# Patient Record
Sex: MALE | Race: WHITE | NOT HISPANIC OR LATINO | Employment: UNEMPLOYED | URBAN - NONMETROPOLITAN AREA
[De-identification: names, ages, dates, MRNs, and addresses within clinical notes are randomized per-mention and may not be internally consistent; named-entity substitution may affect disease eponyms.]

---

## 2022-09-21 ENCOUNTER — OFFICE VISIT (OUTPATIENT)
Dept: SURGERY | Facility: CLINIC | Age: 52
End: 2022-09-21

## 2022-09-21 VITALS
SYSTOLIC BLOOD PRESSURE: 156 MMHG | WEIGHT: 210 LBS | DIASTOLIC BLOOD PRESSURE: 102 MMHG | RESPIRATION RATE: 16 BRPM | HEIGHT: 67 IN | BODY MASS INDEX: 32.96 KG/M2 | HEART RATE: 80 BPM

## 2022-09-21 DIAGNOSIS — L98.8 DYSPLASTIC SKIN LESION: ICD-10-CM

## 2022-09-21 DIAGNOSIS — R07.9 CHEST PAIN, UNSPECIFIED TYPE: Primary | ICD-10-CM

## 2022-09-21 PROCEDURE — 99203 OFFICE O/P NEW LOW 30 MIN: CPT | Performed by: SURGERY

## 2022-09-21 RX ORDER — ASPIRIN 325 MG
325 TABLET ORAL DAILY
COMMUNITY

## 2022-09-22 ENCOUNTER — TELEPHONE (OUTPATIENT)
Dept: SURGERY | Facility: CLINIC | Age: 52
End: 2022-09-22

## 2022-09-22 DIAGNOSIS — R07.9 CHEST PAIN, UNSPECIFIED TYPE: Primary | ICD-10-CM

## 2023-03-21 NOTE — PROGRESS NOTES
ASSESSMENT/PLAN:  1.  Well Adult Visit:  Anticipatory guidance provided (SEE After visit summary and note).  Immunizations: Deferred  Pap Smear: not indicated  Colonoscopy: Not indicated at this time.  Mammogram: Not indicated.  Labs: See Orders  Follow-Up: Annually or sooner as needed for illness/concerns.  Body mass index is 25.94 kg/m².: Patient BMI is within normal range.    SUBJECTIVE:  Chief Complaint   Patient presents with   • Physical       Diet: Could be better  Calcium: Supplement  Exercise: Not enough  Tobacco Use: None  Alcohol:  Social  Relationships: Good  Immunizations: UTD  Hearing:  Normal  Vision: Normal  Smoke Detectors: Yes  Carbon Monoxide Detectors: Yes  Seatbelt: 100%  Dentist: Needs to see.    Other Concerns:  None    I have reviewed the patient's medications and allergies, past medical, surgical, social and family history, updating these as appropriate.  See Histories section of the Electronic Medical Record for a display of this information.    OBJECTIVE:  Blood pressure 119/77, pulse 84, resp. rate 18, height 5' (1.524 m), weight 60.2 kg (132 lb 12.8 oz), SpO2 100 %.Body mass index is 25.94 kg/m².  Vital signs: Reviewed as documented in the nursing notes.  General: The patient is alert, cooperative and conversive.   Skin:  Warm, dry and intact without evidence of rash.    Head:  Normocephalic-atraumatic.   Neck:  The trachea is midline. No evidence of jugular venous distention.  Normal symmetrical thyroid without mass or tenderness.  Eye:  Pupils are equal, round and reactive to light.  Bilateral extraocular movements are intact.  Normal conjunctivae and sclerae bilaterally.     EENT:  Tympanic membranes and external auditory canals are normal bilaterally.  No pharyngeal erythema or exudate.  No facial tenderness.  Normal nasal mucosa.   Chest wall:  No deformity or tenderness to palpation.  Cardiovascular:  Regular rate and rhythm without murmur.  Normal perfusion with symmetrical  Kenji Dawson 51 y.o. male presents @ the req of Dr. Jarquin for eval of c-scope and multiple skin lesions.  Pt also wanted to report that he started an intense workout program approx 6-8 weeks ago and experienced angina.  Pt since that time placed himself on Aspirin 325 mg 2-3 times a day.  Pt does not have a cardiologist.  Pt is aware he has HTN but does not take any meds.  + H/O basal cell CA on face and is willing to see derm but states his ins req the ef to com from PCP.   Chief Complaint   Patient presents with   • Colonoscopy   • Skin Lesion             HPI   Above-noted agree.  Kenji is a very pleasant 50-year-old male.  He had a basal cell carcinoma removed from his nose.  He has a dysplastic appearing skin lesion on his right arm and a small nodule on his back that he would like removed.  Unfortunately, he has been having issues with chest pain on exertion and has started to take high-dose aspirin.  He has never seen a cardiologist before.  He is very anxious.  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  He does have hypertension.  He does not want to take any type of medication for it.  He does not smoke or use tobacco products.  He has never had a colonoscopy for.  He says he has had issues with his GI track.  He says he was diagnosed with diverticulitis in the past.  He is willing to get a colonoscopy but at this time he wants to hold off on that.      Review of Systems   All other systems reviewed and are negative.            Current Outpatient Medications:   •  aspirin 325 MG tablet, Take 325 mg by mouth Daily., Disp: , Rfl:         No Known Allergies        Past Medical History:   Diagnosis Date   • HTN (hypertension)    • Polycystic kidney disease            Past Surgical History:   Procedure Laterality Date   • APPENDECTOMY             Social History     Tobacco Use   • Smoking status: Never Smoker   • Smokeless tobacco: Never Used   Substance Use  "Topics   • Alcohol use: Yes             There is no immunization history on file for this patient.        Physical Exam  Vitals and nursing note reviewed.   Constitutional:       Appearance: Normal appearance.   HENT:      Head: Normocephalic and atraumatic.   Musculoskeletal:         General: No swelling or tenderness.   Skin:     Comments: Less than 1 cm sized raised skin lesion on the back.  On the right forearm is a brown irregularly shaped less than 1 cm skin lesion.   Neurological:      General: No focal deficit present.      Mental Status: He is alert and oriented to person, place, and time.   Psychiatric:         Mood and Affect: Mood normal.         Behavior: Behavior normal.         Debilities/Disabilities Identified: None    Emotional Behavior: Appropriate      BP (!) 156/102   Pulse 80   Resp 16   Ht 170.2 cm (67\")   Wt 95.3 kg (210 lb)   BMI 32.89 kg/m²         Diagnoses and all orders for this visit:    1. Chest pain, unspecified type (Primary)    2. Dysplastic skin lesion    Prior to doing any type of surgical procedure on Kenji, we are going to refer him to cardiology.  I had a long discussion with him about the importance of taking care of himself.  We discussed if he did not take care of himself he would be unable to take care of any elderly parent.  We will put in the referral to see Dr. JOE and follow-up with him after that.    Thank you for allowing me to participate in the care of this interesting patient.      " pulses.   Respiratory:  Clear to auscultation with symmetrical  breath sounds.  Gastrointestinal:  Non-distended, soft and nontender.  Normal bowel sounds.  No hepatomegaly or splenomegaly.  Back:  No costovertebral angle tenderness or midline bony tenderness.  Normal alignment.      Musculoskeletal:  No deformity, edema or joint effusion.  No tenderness to palpation.     Neurological:  Alert and oriented times 4.  Cranial Nerves 2-12 intact.  No focal neurological deficits.  No lateralizing signs.  Normal tandem gait without ataxia.  Psychiatric:  Cooperative.  Appropriate mood and affect.

## 2024-08-01 ENCOUNTER — PREP FOR SURGERY (OUTPATIENT)
Dept: OTHER | Facility: HOSPITAL | Age: 54
End: 2024-08-01
Payer: MEDICAID

## 2024-08-01 ENCOUNTER — HOSPITAL ENCOUNTER (OUTPATIENT)
Facility: HOSPITAL | Age: 54
Setting detail: OBSERVATION
LOS: 1 days | Discharge: HOME | End: 2024-08-03
Attending: SURGERY | Admitting: SURGERY
Payer: MEDICAID

## 2024-08-01 DIAGNOSIS — K80.20 GALLSTONES: ICD-10-CM

## 2024-08-01 DIAGNOSIS — K80.00 ACUTE CHOLECYSTITIS DUE TO BILIARY CALCULUS: Primary | ICD-10-CM

## 2024-08-01 DIAGNOSIS — K80.20 GALLSTONES: Primary | ICD-10-CM

## 2024-08-01 PROCEDURE — 94761 N-INVAS EAR/PLS OXIMETRY MLT: CPT

## 2024-08-01 PROCEDURE — G0379 DIRECT REFER HOSPITAL OBSERV: HCPCS

## 2024-08-01 PROCEDURE — 96375 TX/PRO/DX INJ NEW DRUG ADDON: CPT

## 2024-08-01 PROCEDURE — 25010000002 ONDANSETRON PER 1 MG: Performed by: SURGERY

## 2024-08-01 PROCEDURE — 96374 THER/PROPH/DIAG INJ IV PUSH: CPT

## 2024-08-01 PROCEDURE — 96361 HYDRATE IV INFUSION ADD-ON: CPT

## 2024-08-01 PROCEDURE — 25010000002 HYDROMORPHONE PER 4 MG: Performed by: SURGERY

## 2024-08-01 PROCEDURE — 25810000003 LACTATED RINGERS PER 1000 ML: Performed by: SURGERY

## 2024-08-01 PROCEDURE — G0378 HOSPITAL OBSERVATION PER HR: HCPCS

## 2024-08-01 RX ORDER — SODIUM CHLORIDE, SODIUM LACTATE, POTASSIUM CHLORIDE, CALCIUM CHLORIDE 600; 310; 30; 20 MG/100ML; MG/100ML; MG/100ML; MG/100ML
100 INJECTION, SOLUTION INTRAVENOUS CONTINUOUS
Status: DISCONTINUED | OUTPATIENT
Start: 2024-08-01 | End: 2024-08-02

## 2024-08-01 RX ORDER — HYDROCODONE BITARTRATE AND ACETAMINOPHEN 5; 325 MG/1; MG/1
1 TABLET ORAL EVERY 6 HOURS PRN
Status: DISCONTINUED | OUTPATIENT
Start: 2024-08-01 | End: 2024-08-02

## 2024-08-01 RX ORDER — SODIUM CHLORIDE 9 MG/ML
40 INJECTION, SOLUTION INTRAVENOUS AS NEEDED
Status: DISCONTINUED | OUTPATIENT
Start: 2024-08-01 | End: 2024-08-02

## 2024-08-01 RX ORDER — HYDROMORPHONE HYDROCHLORIDE 1 MG/ML
0.5 INJECTION, SOLUTION INTRAMUSCULAR; INTRAVENOUS; SUBCUTANEOUS
Status: DISCONTINUED | OUTPATIENT
Start: 2024-08-01 | End: 2024-08-02

## 2024-08-01 RX ORDER — SODIUM CHLORIDE 0.9 % (FLUSH) 0.9 %
10 SYRINGE (ML) INJECTION EVERY 12 HOURS SCHEDULED
Status: DISCONTINUED | OUTPATIENT
Start: 2024-08-01 | End: 2024-08-02

## 2024-08-01 RX ORDER — ONDANSETRON 2 MG/ML
4 INJECTION INTRAMUSCULAR; INTRAVENOUS EVERY 6 HOURS PRN
Status: DISCONTINUED | OUTPATIENT
Start: 2024-08-01 | End: 2024-08-02

## 2024-08-01 RX ORDER — SODIUM CHLORIDE 0.9 % (FLUSH) 0.9 %
10 SYRINGE (ML) INJECTION AS NEEDED
Status: DISCONTINUED | OUTPATIENT
Start: 2024-08-01 | End: 2024-08-02

## 2024-08-01 RX ADMIN — HYDROMORPHONE HYDROCHLORIDE 0.5 MG: 1 INJECTION, SOLUTION INTRAMUSCULAR; INTRAVENOUS; SUBCUTANEOUS at 18:17

## 2024-08-01 RX ADMIN — ONDANSETRON 4 MG: 2 INJECTION INTRAMUSCULAR; INTRAVENOUS at 18:35

## 2024-08-01 RX ADMIN — SODIUM CHLORIDE, POTASSIUM CHLORIDE, SODIUM LACTATE AND CALCIUM CHLORIDE 100 ML/HR: 600; 310; 30; 20 INJECTION, SOLUTION INTRAVENOUS at 18:35

## 2024-08-01 RX ADMIN — HYDROCODONE BITARTRATE AND ACETAMINOPHEN 1 TABLET: 5; 325 TABLET ORAL at 19:18

## 2024-08-01 NOTE — PLAN OF CARE
Goal Outcome Evaluation:  Plan of Care Reviewed With: patient        Progress: no change  Outcome Evaluation: New admit, pt requesting to speak to CM due to being uninsured and needing to know approxiomater cost for surgery and stay. He is also wanting to assure we use lab and US results he had done at [facility] this am and not repeat tests.    Test results obtained from [facility] were taken to registration and scanned into registration docs. Copies placed in paper chart for MD to review.

## 2024-08-01 NOTE — H&P
General Surgery      Patient Care Team:  Provider, No Known as PCP - General    CHIEF COMPLAINT: Acute cholecystitis with cholelithiasis    HISTORY OF PRESENT ILLNESS:    This very pleasant 53-year-old male is actually known to my service.  I saw him several years ago where we discussed having colonoscopy and some basal cell lesions excised.  He presented to [facility]'s emergency department today with right upper quadrant pain rating around to his back and nausea vomiting.  He had elevated white count of 11 and a ultrasound that showed gallstones with gallbladder wall thickening.  They did not have a surgeon available so I excepted him here at [place] in transfer.  This is a third attack in about the last 6 months.  Each attack has gotten more severe.      Past Medical History:   Diagnosis Date    HTN (hypertension)     Polycystic kidney disease      Past Surgical History:   Procedure Laterality Date    APPENDECTOMY       Family History   Problem Relation Age of Onset    Heart disease Mother     Heart disease Father      Social History     Tobacco Use    Smoking status: Never    Smokeless tobacco: Never   Vaping Use    Vaping status: Never Used   Substance Use Topics    Alcohol use: Yes     Alcohol/week: 10.0 standard drinks of alcohol     Types: 10 Cans of beer per week    Drug use: Never     Medications Prior to Admission   Medication Sig Dispense Refill Last Dose    aspirin 325 MG tablet Take 1 tablet by mouth Daily.        Allergies:  Patient has no known allergies.    REVIEW OF SYSTEMS:  Please see the above history of present illness for pertinent positives and negatives.  The remainder of the patient's systems have been reviewed and are negative.     Vital Signs  Temp:  [97.7 °F (36.5 °C)] 97.7 °F (36.5 °C)  Heart Rate:  [89] 89  Resp:  [20] 20  BP: (196)/(102) 196/102         Physical Exam:  Physical Exam   Constitutional: Patient appears well-developed and well-nourished and in no acute distress    HEENT:   Head: Normocephalic and atraumatic.   Cardiovascular: Regular rate, regular rhythm.  Pulmonary/Chest: Lungs are clear to auscultation bilaterally.  Abdominal: Soft, good bowel sounds  Musculoskeletal: Normal posture.  Extremities: No edema.   Neurological: Patient is alert and oriented.  Psychological:   Mood and behavior appropriate.  Skin: Skin is warm and dry.    Debilities/Disabilities Identified: None    Emotional Behavior: Appropriate           Results Review:    I reviewed the patient's new clinical results.  Lab Results (most recent)       None            Imaging Results (Most Recent)       None              ECG/EMG Results (most recent)       None              Assessment & Plan     Acute cholecystitis with cholelithiasis  I discussed with Kenji the benefits and risks and rationale for a laparoscopic cholecystectomy with intraoperative cholangiograms.  All of his questions were answered and he is willing to proceed.    Keya Quigley DO  08/01/24  18:09 EDT

## 2024-08-02 ENCOUNTER — APPOINTMENT (OUTPATIENT)
Dept: GENERAL RADIOLOGY | Facility: HOSPITAL | Age: 54
End: 2024-08-02
Payer: MEDICAID

## 2024-08-02 ENCOUNTER — ANESTHESIA (OUTPATIENT)
Dept: PERIOP | Facility: HOSPITAL | Age: 54
End: 2024-08-02
Payer: MEDICAID

## 2024-08-02 ENCOUNTER — ANESTHESIA EVENT (OUTPATIENT)
Dept: PERIOP | Facility: HOSPITAL | Age: 54
End: 2024-08-02
Payer: MEDICAID

## 2024-08-02 PROBLEM — K80.00 ACUTE CHOLECYSTITIS DUE TO BILIARY CALCULUS: Status: RESOLVED | Noted: 2024-08-01 | Resolved: 2024-08-02

## 2024-08-02 PROBLEM — K80.20 GALLSTONES: Status: RESOLVED | Noted: 2024-08-01 | Resolved: 2024-08-02

## 2024-08-02 LAB
ALBUMIN SERPL-MCNC: 4.3 G/DL (ref 3.5–5.2)
ALBUMIN/GLOB SERPL: 1.5 G/DL
ALP SERPL-CCNC: 54 U/L (ref 39–117)
ALT SERPL W P-5'-P-CCNC: 17 U/L (ref 1–41)
ANION GAP SERPL CALCULATED.3IONS-SCNC: 9.6 MMOL/L (ref 5–15)
AST SERPL-CCNC: 17 U/L (ref 1–40)
BASOPHILS # BLD AUTO: 0.03 10*3/MM3 (ref 0–0.2)
BASOPHILS NFR BLD AUTO: 0.3 % (ref 0–1.5)
BILIRUB SERPL-MCNC: 0.8 MG/DL (ref 0–1.2)
BUN SERPL-MCNC: 7 MG/DL (ref 6–20)
BUN/CREAT SERPL: 8.2 (ref 7–25)
CALCIUM SPEC-SCNC: 9.1 MG/DL (ref 8.6–10.5)
CHLORIDE SERPL-SCNC: 102 MMOL/L (ref 98–107)
CO2 SERPL-SCNC: 27.4 MMOL/L (ref 22–29)
CREAT SERPL-MCNC: 0.85 MG/DL (ref 0.76–1.27)
DEPRECATED RDW RBC AUTO: 42.7 FL (ref 37–54)
EGFRCR SERPLBLD CKD-EPI 2021: 102.6 ML/MIN/1.73
EOSINOPHIL # BLD AUTO: 0.01 10*3/MM3 (ref 0–0.4)
EOSINOPHIL NFR BLD AUTO: 0.1 % (ref 0.3–6.2)
ERYTHROCYTE [DISTWIDTH] IN BLOOD BY AUTOMATED COUNT: 12.4 % (ref 12.3–15.4)
GLOBULIN UR ELPH-MCNC: 2.9 GM/DL
GLUCOSE SERPL-MCNC: 110 MG/DL (ref 65–99)
HCT VFR BLD AUTO: 43.1 % (ref 37.5–51)
HGB BLD-MCNC: 14.4 G/DL (ref 13–17.7)
IMM GRANULOCYTES # BLD AUTO: 0.01 10*3/MM3 (ref 0–0.05)
IMM GRANULOCYTES NFR BLD AUTO: 0.1 % (ref 0–0.5)
LYMPHOCYTES # BLD AUTO: 1.61 10*3/MM3 (ref 0.7–3.1)
LYMPHOCYTES NFR BLD AUTO: 15.4 % (ref 19.6–45.3)
MCH RBC QN AUTO: 30.6 PG (ref 26.6–33)
MCHC RBC AUTO-ENTMCNC: 33.4 G/DL (ref 31.5–35.7)
MCV RBC AUTO: 91.7 FL (ref 79–97)
MONOCYTES # BLD AUTO: 0.83 10*3/MM3 (ref 0.1–0.9)
MONOCYTES NFR BLD AUTO: 7.9 % (ref 5–12)
NEUTROPHILS NFR BLD AUTO: 7.97 10*3/MM3 (ref 1.7–7)
NEUTROPHILS NFR BLD AUTO: 76.2 % (ref 42.7–76)
PLATELET # BLD AUTO: 288 10*3/MM3 (ref 140–450)
PMV BLD AUTO: 10.2 FL (ref 6–12)
POTASSIUM SERPL-SCNC: 3.7 MMOL/L (ref 3.5–5.2)
PROT SERPL-MCNC: 7.2 G/DL (ref 6–8.5)
RBC # BLD AUTO: 4.7 10*6/MM3 (ref 4.14–5.8)
SODIUM SERPL-SCNC: 139 MMOL/L (ref 136–145)
WBC NRBC COR # BLD AUTO: 10.46 10*3/MM3 (ref 3.4–10.8)

## 2024-08-02 PROCEDURE — 25010000002 FENTANYL CITRATE (PF) 50 MCG/ML SOLUTION

## 2024-08-02 PROCEDURE — 25010000002 CEFAZOLIN PER 500 MG: Performed by: SURGERY

## 2024-08-02 PROCEDURE — 25010000002 PHENYLEPHRINE 10 MG/ML SOLUTION

## 2024-08-02 PROCEDURE — 47563 LAPARO CHOLECYSTECTOMY/GRAPH: CPT | Performed by: SURGERY

## 2024-08-02 PROCEDURE — 80053 COMPREHEN METABOLIC PANEL: CPT | Performed by: SURGERY

## 2024-08-02 PROCEDURE — 25510000001 IOPAMIDOL 61 % SOLUTION: Performed by: SURGERY

## 2024-08-02 PROCEDURE — G0378 HOSPITAL OBSERVATION PER HR: HCPCS

## 2024-08-02 PROCEDURE — 25810000003 SODIUM CHLORIDE PER 500 ML: Performed by: SURGERY

## 2024-08-02 PROCEDURE — 25010000002 SUGAMMADEX 200 MG/2ML SOLUTION

## 2024-08-02 PROCEDURE — C1887 CATHETER, GUIDING: HCPCS | Performed by: SURGERY

## 2024-08-02 PROCEDURE — 25010000002 PROPOFOL 200 MG/20ML EMULSION

## 2024-08-02 PROCEDURE — 25010000002 ONDANSETRON PER 1 MG: Performed by: NURSE ANESTHETIST, CERTIFIED REGISTERED

## 2024-08-02 PROCEDURE — 96361 HYDRATE IV INFUSION ADD-ON: CPT

## 2024-08-02 PROCEDURE — 25010000002 SUCCINYLCHOLINE PER 20 MG

## 2024-08-02 PROCEDURE — 88304 TISSUE EXAM BY PATHOLOGIST: CPT | Performed by: SURGERY

## 2024-08-02 PROCEDURE — 25010000002 DEXAMETHASONE PER 1 MG: Performed by: NURSE ANESTHETIST, CERTIFIED REGISTERED

## 2024-08-02 PROCEDURE — 25810000003 LACTATED RINGERS PER 1000 ML: Performed by: NURSE ANESTHETIST, CERTIFIED REGISTERED

## 2024-08-02 PROCEDURE — 25810000003 LACTATED RINGERS PER 1000 ML: Performed by: SURGERY

## 2024-08-02 PROCEDURE — 25010000002 BUPIVACAINE (PF) 0.5 % SOLUTION 30 ML VIAL: Performed by: SURGERY

## 2024-08-02 PROCEDURE — 25010000002 MIDAZOLAM PER 1MG: Performed by: NURSE ANESTHETIST, CERTIFIED REGISTERED

## 2024-08-02 PROCEDURE — 85025 COMPLETE CBC W/AUTO DIFF WBC: CPT | Performed by: SURGERY

## 2024-08-02 PROCEDURE — 25010000002 HYDROMORPHONE 1 MG/ML SOLUTION

## 2024-08-02 PROCEDURE — 94799 UNLISTED PULMONARY SVC/PX: CPT

## 2024-08-02 PROCEDURE — 74300 X-RAY BILE DUCTS/PANCREAS: CPT

## 2024-08-02 PROCEDURE — 94761 N-INVAS EAR/PLS OXIMETRY MLT: CPT

## 2024-08-02 DEVICE — LIGAMAX 5 MM ENDOSCOPIC MULTIPLE CLIP APPLIER
Type: IMPLANTABLE DEVICE | Site: ABDOMEN | Status: FUNCTIONAL
Brand: LIGAMAX

## 2024-08-02 DEVICE — FLOSEAL WITH RECOTHROM - 5ML
Type: IMPLANTABLE DEVICE | Site: ABDOMEN | Status: FUNCTIONAL
Brand: FLOSEAL HEMOSTATIC MATRIX

## 2024-08-02 RX ORDER — METOPROLOL TARTRATE 1 MG/ML
INJECTION, SOLUTION INTRAVENOUS AS NEEDED
Status: DISCONTINUED | OUTPATIENT
Start: 2024-08-02 | End: 2024-08-02 | Stop reason: SURG

## 2024-08-02 RX ORDER — SODIUM CHLORIDE, SODIUM LACTATE, POTASSIUM CHLORIDE, CALCIUM CHLORIDE 600; 310; 30; 20 MG/100ML; MG/100ML; MG/100ML; MG/100ML
100 INJECTION, SOLUTION INTRAVENOUS ONCE
Status: DISCONTINUED | OUTPATIENT
Start: 2024-08-02 | End: 2024-08-02 | Stop reason: HOSPADM

## 2024-08-02 RX ORDER — FENTANYL CITRATE 50 UG/ML
INJECTION, SOLUTION INTRAMUSCULAR; INTRAVENOUS AS NEEDED
Status: DISCONTINUED | OUTPATIENT
Start: 2024-08-02 | End: 2024-08-02 | Stop reason: SURG

## 2024-08-02 RX ORDER — MAGNESIUM HYDROXIDE 1200 MG/15ML
LIQUID ORAL AS NEEDED
Status: DISCONTINUED | OUTPATIENT
Start: 2024-08-02 | End: 2024-08-02 | Stop reason: HOSPADM

## 2024-08-02 RX ORDER — KETOROLAC TROMETHAMINE 30 MG/ML
15 INJECTION, SOLUTION INTRAMUSCULAR; INTRAVENOUS EVERY 6 HOURS PRN
Status: DISCONTINUED | OUTPATIENT
Start: 2024-08-02 | End: 2024-08-03 | Stop reason: HOSPADM

## 2024-08-02 RX ORDER — ONDANSETRON 2 MG/ML
4 INJECTION INTRAMUSCULAR; INTRAVENOUS ONCE AS NEEDED
Status: COMPLETED | OUTPATIENT
Start: 2024-08-02 | End: 2024-08-02

## 2024-08-02 RX ORDER — DEXMEDETOMIDINE HYDROCHLORIDE 100 UG/ML
INJECTION, SOLUTION INTRAVENOUS AS NEEDED
Status: DISCONTINUED | OUTPATIENT
Start: 2024-08-02 | End: 2024-08-02 | Stop reason: SURG

## 2024-08-02 RX ORDER — SUCCINYLCHOLINE CHLORIDE 20 MG/ML
INJECTION INTRAMUSCULAR; INTRAVENOUS AS NEEDED
Status: DISCONTINUED | OUTPATIENT
Start: 2024-08-02 | End: 2024-08-02 | Stop reason: SURG

## 2024-08-02 RX ORDER — ONDANSETRON 2 MG/ML
4 INJECTION INTRAMUSCULAR; INTRAVENOUS EVERY 6 HOURS PRN
Status: DISCONTINUED | OUTPATIENT
Start: 2024-08-02 | End: 2024-08-03 | Stop reason: HOSPADM

## 2024-08-02 RX ORDER — SODIUM CHLORIDE 0.9 % (FLUSH) 0.9 %
10 SYRINGE (ML) INJECTION EVERY 12 HOURS SCHEDULED
Status: DISCONTINUED | OUTPATIENT
Start: 2024-08-02 | End: 2024-08-02 | Stop reason: HOSPADM

## 2024-08-02 RX ORDER — HYDROCODONE BITARTRATE AND ACETAMINOPHEN 7.5; 325 MG/1; MG/1
1 TABLET ORAL ONCE AS NEEDED
Status: COMPLETED | OUTPATIENT
Start: 2024-08-02 | End: 2024-08-02

## 2024-08-02 RX ORDER — PHENYLEPHRINE HYDROCHLORIDE 10 MG/ML
INJECTION INTRAVENOUS AS NEEDED
Status: DISCONTINUED | OUTPATIENT
Start: 2024-08-02 | End: 2024-08-02 | Stop reason: SURG

## 2024-08-02 RX ORDER — HYDROCODONE BITARTRATE AND ACETAMINOPHEN 7.5; 325 MG/1; MG/1
1 TABLET ORAL EVERY 4 HOURS PRN
Status: DISCONTINUED | OUTPATIENT
Start: 2024-08-02 | End: 2024-08-03 | Stop reason: HOSPADM

## 2024-08-02 RX ORDER — KETAMINE HYDROCHLORIDE 10 MG/ML
INJECTION, SOLUTION INTRAMUSCULAR; INTRAVENOUS AS NEEDED
Status: DISCONTINUED | OUTPATIENT
Start: 2024-08-02 | End: 2024-08-02 | Stop reason: SURG

## 2024-08-02 RX ORDER — LIDOCAINE HYDROCHLORIDE 20 MG/ML
INJECTION, SOLUTION INFILTRATION; PERINEURAL AS NEEDED
Status: DISCONTINUED | OUTPATIENT
Start: 2024-08-02 | End: 2024-08-02 | Stop reason: SURG

## 2024-08-02 RX ORDER — PROPOFOL 10 MG/ML
INJECTION, EMULSION INTRAVENOUS AS NEEDED
Status: DISCONTINUED | OUTPATIENT
Start: 2024-08-02 | End: 2024-08-02 | Stop reason: SURG

## 2024-08-02 RX ORDER — SODIUM CHLORIDE 9 MG/ML
INJECTION, SOLUTION INTRAVENOUS AS NEEDED
Status: DISCONTINUED | OUTPATIENT
Start: 2024-08-02 | End: 2024-08-02 | Stop reason: HOSPADM

## 2024-08-02 RX ORDER — FENTANYL CITRATE 50 UG/ML
50 INJECTION, SOLUTION INTRAMUSCULAR; INTRAVENOUS
Status: DISCONTINUED | OUTPATIENT
Start: 2024-08-02 | End: 2024-08-02 | Stop reason: HOSPADM

## 2024-08-02 RX ORDER — ROCURONIUM BROMIDE 10 MG/ML
INJECTION, SOLUTION INTRAVENOUS AS NEEDED
Status: DISCONTINUED | OUTPATIENT
Start: 2024-08-02 | End: 2024-08-02 | Stop reason: SURG

## 2024-08-02 RX ORDER — MIDAZOLAM HYDROCHLORIDE 2 MG/2ML
1 INJECTION, SOLUTION INTRAMUSCULAR; INTRAVENOUS
Status: DISCONTINUED | OUTPATIENT
Start: 2024-08-02 | End: 2024-08-02 | Stop reason: HOSPADM

## 2024-08-02 RX ORDER — SODIUM CHLORIDE, SODIUM LACTATE, POTASSIUM CHLORIDE, CALCIUM CHLORIDE 600; 310; 30; 20 MG/100ML; MG/100ML; MG/100ML; MG/100ML
9 INJECTION, SOLUTION INTRAVENOUS CONTINUOUS PRN
Status: DISCONTINUED | OUTPATIENT
Start: 2024-08-02 | End: 2024-08-02 | Stop reason: HOSPADM

## 2024-08-02 RX ORDER — ONDANSETRON 2 MG/ML
4 INJECTION INTRAMUSCULAR; INTRAVENOUS ONCE AS NEEDED
Status: DISCONTINUED | OUTPATIENT
Start: 2024-08-02 | End: 2024-08-02 | Stop reason: HOSPADM

## 2024-08-02 RX ORDER — ONDANSETRON 4 MG/1
4 TABLET, ORALLY DISINTEGRATING ORAL EVERY 6 HOURS PRN
Status: DISCONTINUED | OUTPATIENT
Start: 2024-08-02 | End: 2024-08-03 | Stop reason: HOSPADM

## 2024-08-02 RX ORDER — SODIUM CHLORIDE 0.9 % (FLUSH) 0.9 %
10 SYRINGE (ML) INJECTION AS NEEDED
Status: DISCONTINUED | OUTPATIENT
Start: 2024-08-02 | End: 2024-08-02 | Stop reason: HOSPADM

## 2024-08-02 RX ORDER — FAMOTIDINE 10 MG/ML
20 INJECTION, SOLUTION INTRAVENOUS
Status: COMPLETED | OUTPATIENT
Start: 2024-08-02 | End: 2024-08-02

## 2024-08-02 RX ORDER — PROMETHAZINE HYDROCHLORIDE 25 MG/1
25 TABLET ORAL ONCE AS NEEDED
Status: DISCONTINUED | OUTPATIENT
Start: 2024-08-02 | End: 2024-08-02 | Stop reason: HOSPADM

## 2024-08-02 RX ORDER — LIDOCAINE HYDROCHLORIDE 10 MG/ML
0.5 INJECTION, SOLUTION INFILTRATION; PERINEURAL ONCE AS NEEDED
Status: DISCONTINUED | OUTPATIENT
Start: 2024-08-02 | End: 2024-08-02 | Stop reason: HOSPADM

## 2024-08-02 RX ORDER — DEXAMETHASONE SODIUM PHOSPHATE 10 MG/ML
8 INJECTION INTRAMUSCULAR; INTRAVENOUS ONCE AS NEEDED
Status: COMPLETED | OUTPATIENT
Start: 2024-08-02 | End: 2024-08-02

## 2024-08-02 RX ADMIN — MIDAZOLAM HYDROCHLORIDE 1 MG: 1 INJECTION, SOLUTION INTRAMUSCULAR; INTRAVENOUS at 12:48

## 2024-08-02 RX ADMIN — KETAMINE HYDROCHLORIDE 20 MG: 10 INJECTION, SOLUTION INTRAMUSCULAR; INTRAVENOUS at 13:27

## 2024-08-02 RX ADMIN — PROPOFOL 50 MG: 10 INJECTION, EMULSION INTRAVENOUS at 13:33

## 2024-08-02 RX ADMIN — SODIUM CHLORIDE, POTASSIUM CHLORIDE, SODIUM LACTATE AND CALCIUM CHLORIDE 9 ML/HR: 600; 310; 30; 20 INJECTION, SOLUTION INTRAVENOUS at 12:47

## 2024-08-02 RX ADMIN — ONDANSETRON 4 MG: 2 INJECTION INTRAMUSCULAR; INTRAVENOUS at 12:48

## 2024-08-02 RX ADMIN — DEXMEDETOMIDINE 8 MCG: 200 INJECTION, SOLUTION INTRAVENOUS at 13:27

## 2024-08-02 RX ADMIN — DEXMEDETOMIDINE 8 MCG: 200 INJECTION, SOLUTION INTRAVENOUS at 13:53

## 2024-08-02 RX ADMIN — HYDROCODONE BITARTRATE AND ACETAMINOPHEN 1 TABLET: 7.5; 325 TABLET ORAL at 15:49

## 2024-08-02 RX ADMIN — CEFAZOLIN 2000 MG: 2 INJECTION, POWDER, FOR SOLUTION INTRAVENOUS at 21:35

## 2024-08-02 RX ADMIN — HYDROMORPHONE HYDROCHLORIDE 0.5 MG: 1 INJECTION, SOLUTION INTRAMUSCULAR; INTRAVENOUS; SUBCUTANEOUS at 14:25

## 2024-08-02 RX ADMIN — SUCCINYLCHOLINE CHLORIDE 140 MG: 20 INJECTION, SOLUTION INTRAMUSCULAR; INTRAVENOUS at 13:28

## 2024-08-02 RX ADMIN — SUGAMMADEX 200 MG: 100 INJECTION, SOLUTION INTRAVENOUS at 14:39

## 2024-08-02 RX ADMIN — HYDROMORPHONE HYDROCHLORIDE 0.25 MG: 1 INJECTION, SOLUTION INTRAMUSCULAR; INTRAVENOUS; SUBCUTANEOUS at 13:57

## 2024-08-02 RX ADMIN — LIDOCAINE HYDROCHLORIDE 60 MG: 20 INJECTION, SOLUTION INFILTRATION; PERINEURAL at 13:27

## 2024-08-02 RX ADMIN — CEFAZOLIN 2000 MG: 2 INJECTION, POWDER, FOR SOLUTION INTRAVENOUS at 13:36

## 2024-08-02 RX ADMIN — DEXMEDETOMIDINE 4 MCG: 200 INJECTION, SOLUTION INTRAVENOUS at 14:04

## 2024-08-02 RX ADMIN — PHENYLEPHRINE HYDROCHLORIDE 50 MCG: 10 INJECTION INTRAVENOUS at 13:41

## 2024-08-02 RX ADMIN — HYDROMORPHONE HYDROCHLORIDE 0.25 MG: 1 INJECTION, SOLUTION INTRAMUSCULAR; INTRAVENOUS; SUBCUTANEOUS at 14:21

## 2024-08-02 RX ADMIN — FENTANYL CITRATE 50 MCG: 50 INJECTION, SOLUTION INTRAMUSCULAR; INTRAVENOUS at 13:21

## 2024-08-02 RX ADMIN — ROCURONIUM 25 MG: 50 INJECTION, SOLUTION INTRAVENOUS at 13:33

## 2024-08-02 RX ADMIN — HYDROCODONE BITARTRATE AND ACETAMINOPHEN 1 TABLET: 5; 325 TABLET ORAL at 08:42

## 2024-08-02 RX ADMIN — Medication 10 ML: at 10:56

## 2024-08-02 RX ADMIN — DEXAMETHASONE SODIUM PHOSPHATE 8 MG: 10 INJECTION INTRAMUSCULAR; INTRAVENOUS at 12:48

## 2024-08-02 RX ADMIN — PROPOFOL 150 MG: 10 INJECTION, EMULSION INTRAVENOUS at 13:27

## 2024-08-02 RX ADMIN — METOPROLOL TARTRATE 2 MG: 5 INJECTION INTRAVENOUS at 13:55

## 2024-08-02 RX ADMIN — SODIUM CHLORIDE, POTASSIUM CHLORIDE, SODIUM LACTATE AND CALCIUM CHLORIDE 100 ML/HR: 600; 310; 30; 20 INJECTION, SOLUTION INTRAVENOUS at 04:47

## 2024-08-02 RX ADMIN — FAMOTIDINE 20 MG: 10 INJECTION, SOLUTION INTRAVENOUS at 12:48

## 2024-08-02 NOTE — ANESTHESIA POSTPROCEDURE EVALUATION
Patient: Kenji Dawson    Procedure Summary       Date: 08/02/24 Room / Location: [facility] OR 4 /  [facility] OR    Anesthesia Start: 1306 Anesthesia Stop: 1511    Procedure: CHOLECYSTECTOMY LAPAROSCOPIC INTRAOPERATIVE CHOLANGIOGRAM (Abdomen) Diagnosis:       Gallstones      (Gallstones [K80.20])    Surgeons: Keya Quigley DO Provider: Paula Doll CRNA    Anesthesia Type: general ASA Status: 2            Anesthesia Type: general    Vitals  Vitals Value Taken Time   /100 08/02/24 1555   Temp 97.9 °F (36.6 °C) 08/02/24 1520   Pulse 69 08/02/24 1557   Resp 18 08/02/24 1550   SpO2 96 % 08/02/24 1557   Vitals shown include unfiled device data.        Post Anesthesia Care and Evaluation    Patient location during evaluation: PACU  Patient participation: complete - patient participated  Level of consciousness: awake  Pain management: adequate    Airway patency: patent  Anesthetic complications: No anesthetic complications  PONV Status: none  Cardiovascular status: acceptable  Respiratory status: acceptable  Hydration status: acceptable

## 2024-08-02 NOTE — INTERVAL H&P NOTE
"  H&P reviewed. The patient was examined and there are no changes to the H&P.      BP (!) 169/103 (BP Location: Right arm, Patient Position: Lying)   Pulse 79   Temp 98 °F (36.7 °C) (Oral)   Resp 16   Ht 170.2 cm (67\")   Wt 74.8 kg (165 lb)   SpO2 95%   BMI 25.84 kg/m²         "

## 2024-08-02 NOTE — ANESTHESIA PREPROCEDURE EVALUATION
Anesthesia Evaluation     Patient summary reviewed   no history of anesthetic complications:   NPO Solid Status: > 8 hours  NPO Liquid Status: > 8 hours           Airway   Mallampati: II  TM distance: >3 FB  No difficulty expected  Dental - normal exam     Comment: Top two front teeth are implants    Pulmonary - negative pulmonary ROS and normal exam    breath sounds clear to auscultation  Cardiovascular   Exercise tolerance: good (4-7 METS)    Rhythm: regular  Rate: normal    (+) hypertension (on this admission BP up)      Neuro/Psych- negative ROS  GI/Hepatic/Renal/Endo    (+) renal disease (polycystic)-    Musculoskeletal (-) negative ROS    Abdominal    Substance History   (+) alcohol use (12 beers a week)     OB/GYN          Other      history of cancer (Basal cell on nose)                      Anesthesia Plan    ASA 2     general     intravenous induction     Anesthetic plan, risks, benefits, and alternatives have been provided, discussed and informed consent has been obtained with: patient.  Pre-procedure education provided  Use of blood products discussed with patient  Consented to blood products.    Plan discussed with CRNA.        CODE STATUS:

## 2024-08-02 NOTE — ANESTHESIA PROCEDURE NOTES
Airway  Urgency: elective    Date/Time: 8/2/2024 1:29 PM  Airway not difficult    General Information and Staff    Patient location during procedure: OR    Indications and Patient Condition  Indications for airway management: airway protection    Preoxygenated: yes  Mask difficulty assessment: 1 - vent by mask    Final Airway Details  Final airway type: endotracheal airway      Successful airway: ETT  Cuffed: yes   Successful intubation technique: direct laryngoscopy  Endotracheal tube insertion site: oral  Blade: Laverne  Blade size: 4  ETT size (mm): 7.5  Cormack-Lehane Classification: grade I - full view of glottis  Placement verified by: chest auscultation and capnometry   Cuff volume (mL): 7  Measured from: lips  ETT/EBT  to lips (cm): 23  Number of attempts at approach: 1  Assessment: lips, teeth, and gum same as pre-op

## 2024-08-02 NOTE — SIGNIFICANT NOTE
08/02/24 0629   Clinician Notification   Reason for Communication Other (Comment)  (Patient's BP has been constantly high above 160/90 since admnission. Patient has a history of HTN but is not on any maintenance.)

## 2024-08-02 NOTE — PLAN OF CARE
Goal Outcome Evaluation:  Plan of Care Reviewed With: patient        Progress: no change  Outcome Evaluation: Patient reported that pain is under control through out the shift. Kept NPO post MN, for Laparoscopic Cholecystectomy today, IVF maintained.

## 2024-08-02 NOTE — OP NOTE
PREOPERATIVE DIAGNOSIS: Acute cholecystitis with cholelithiasis  POSTOPERATIVE DIAGNOSIS: Acute necrotizing cholecystitis with cholelithiasis   PROCEDURE: Laparoscopic cholecystectomy with Intraoperative cholangiogram  SURGEON/STAFF: Soraida  Assist:  none  SPECIMENS: Gallbladder.  INTRAOPERATIVE COMPLICATIONS: None.  ANESTHESIA: General.  BLOOD LOSS:  10 ml  COUNTS: Needle and sponge counts correct.     Clinical Note: This very pleasant patient was accepted in transfer from [facility] with the aforementioned complaints.  Benefits and risks of a laparoscopic cholecystectomy with intraoperative cholangiogram possible open procedure were discussed.  Benefits and risks not limited to but including:Bleeding, infection, hernia formation, injury to intra-abdominal structures, bile leak, biloma, intra-abdominal abscess, DVT, PE, atelectasis pneumonia, anesthesia complications. He agreed and was consented for operative management without duress.     PROCEDURE: The patient was brought to the operating room in stable condition. Perioperative antibiotics were given and sequential compression devices applied. He was then laid supine on the operating room table. General anesthesia was induced by the Anesthesia Service without difficulty.     At this time, the patient's abdomen was accessed at the level of the umbilicus in open cutdown technique and insertion of a 12-mm trocar. The abdomen was then insufflated. Brief survey of the abdominal cavity revealed no evidence of injury from insertion of the trocar, or no other intraabdominal pathology. At this time the patient was placed in steep reverse Trendelenburg and a slightly left-side down position. Three additional 5-mm trocars were placed, all in the standard position. This was under direct vision.       The peritoneal attachment of the gallbladder at the level of the infundibulum was scored from laterally to medially, terminating at the level of the hepatic edge.  The peritoneum was gently stripped down, exposing the underlying cystic artery and cystic duct. This was also done on the lateral side of the gallbladder by reflecting the gallbladder medially. The peritoneal attachment here was again gently dissected inferiorly. After completely exposing the cystic duct as well as cystic artery, a retro-cystic window was created. These 2 structures, the cystic duct and cystic artery, were further delineated. A firm critical view was obtained, visualizing healthy-appearing hepatic tissue behind the 2 structures, with no evidence of looping, bridging or tenting of the common bile duct.     A clip was placed distally at the cystic duct and a cystic duct incision with laparoscopic scissors was performed. A percutaneous cholangio-catheter was inserted into the patient abdomen. The catheter was placed and secured into the cystic duct. Cholangiogram was performed that showed normal cystic duct, normal hepatic ducts, normal common bile duct with good spillage of dye into the duodenum with no evidence of any obstructions. The cholangiocatheter was removed and the distal portion of thecystic duct was then clipped twice and ligated.  The cystic artery was then triply clipped and ligated.  It was inspected and seen to be in intact. The gallbladder was then carefully dissected off the hepatic bed using hook electrocautery. It was firmly adherent to the underlying bed, and an effort careful and meticulous hemostasis was undertaken. The gallbladder, after being removed from the hepatic bed, was placed in an EndoCatch bag. It was removed through the umbilical trocar without difficulty.    Next, the umbilical trocar was reinserted into the abdomen and the liver bed was inspected. Hemostasis was perfected using Floseal. Copious irrigation was carried out. The clips were intact and there was no bleeding or bile leakage noted.  A 10 mm flat SILVERIO drain was placed in the liver bed and pulled out through  the sideport.  The trocars were then removed under direct vision, air was deflated from the abdomen, and a Cali trocar site fascia was closed with 0 Vicryl suture.  The incisions were then closed with 40 Vicryls suture. Sterile dressings were applied.    Anesthesia was reversed, the ET tube and OG tube were removed.  And the patient was taken into the recovery area in stable postoperative condition having tolerated the procedure well.    LAYLA Quigley DO

## 2024-08-02 NOTE — CASE MANAGEMENT/SOCIAL WORK
Noted CM consult regarding patient financial concerns r/t no insurance and needing resources. Attempt to screen patient and he is off unit for surgery. CM # placed on white board, no visitors present in patient room. Patient has been screened by [employer] and is now listed as medicaid pending. CM will follow up regarding dc needs after surgery.

## 2024-08-02 NOTE — PLAN OF CARE
Goal Outcome Evaluation:   Pt tolerated surgery well. He has voided and ambulated after surgery and currently has no complaints of pain at this time just generalized soreness but denies needs for pain medications at this time.

## 2024-08-03 VITALS
WEIGHT: 165 LBS | RESPIRATION RATE: 20 BRPM | DIASTOLIC BLOOD PRESSURE: 85 MMHG | HEART RATE: 68 BPM | TEMPERATURE: 98.3 F | SYSTOLIC BLOOD PRESSURE: 148 MMHG | OXYGEN SATURATION: 96 % | HEIGHT: 67 IN | BODY MASS INDEX: 25.9 KG/M2

## 2024-08-03 PROCEDURE — G0378 HOSPITAL OBSERVATION PER HR: HCPCS

## 2024-08-03 PROCEDURE — 25010000002 CEFAZOLIN PER 500 MG: Performed by: SURGERY

## 2024-08-03 RX ORDER — HYDROCODONE BITARTRATE AND ACETAMINOPHEN 5; 325 MG/1; MG/1
1 TABLET ORAL EVERY 6 HOURS PRN
Qty: 10 TABLET | Refills: 0 | Status: SHIPPED | OUTPATIENT
Start: 2024-08-03

## 2024-08-03 RX ADMIN — CEFAZOLIN 2000 MG: 2 INJECTION, POWDER, FOR SOLUTION INTRAVENOUS at 05:37

## 2024-08-03 NOTE — CASE MANAGEMENT/SOCIAL WORK
Case Management Discharge Note      Final Note: Home         Selected Continued Care - Admitted Since 8/1/2024       Destination    No services have been selected for the patient.                Durable Medical Equipment    No services have been selected for the patient.                Dialysis/Infusion    No services have been selected for the patient.                Home Medical Care    No services have been selected for the patient.                Therapy    No services have been selected for the patient.                Community Resources    No services have been selected for the patient.                Community & DME    No services have been selected for the patient.                         Final Discharge Disposition Code: 01 - home or self-care

## 2024-08-03 NOTE — DISCHARGE SUMMARY
Discharge Summary    Patient name: Kenji Dawson    Medical record number: [id number]    Admission date: 8/1/2024  Discharge date:  8/3/2024    Attending physician: Dr. LAYLA Quigley    Primary care physician: Provider, No Known    Referring physician: [place] [address]    Consulting physician(s):    Condition on discharge: improving    Primary Diagnoses: Acute necrotizing cholecystitis    Secondary Diagnoses:     Operative Procedure: Laparoscopic cholecystectomy with intraoperative cholangiogram    Hospital Course: The patient is a very pleasant 53 y.o. male that was admitted to the hospital with acute necrotizing cholecystitis.  He was accepted as a transfer from [facility].  By postoperative day 1 he was tolerating a regular diet and moving his bowels without difficulty.  His drain output was nonbilious.  His pain was well-controlled.  His drain was removed.  His prognosis is good.    Discharge medications:      Discharge Medications        New Medications        Instructions Start Date   HYDROcodone-acetaminophen 5-325 MG per tablet  Commonly known as: Norco   1 tablet, Oral, Every 6 Hours PRN               Discharge instructions:    No driving for 1 week and when no longer taking narcotics.  You may ride in a car  Diet as tolerated  You may walk as tolerated  You may walk up and down stairs as tolerated  Leave your dressings on for 3 days  After 3 days you may shower and remove your dressings  No lifting greater than 10 pounds for 2 weeks  If you develop constipation, take a tablespoon of Milk of Magnesia once a day as needed  You may place ice packs on your incisions for 20 minutes at a time as needed for pain   You may take ibuprofen as directed for additional pain control    Follow-up appointment: Follow up with Dr. Quigley in the office in 2 weeks. Call for appointment at [phone].

## 2024-08-03 NOTE — CASE MANAGEMENT/SOCIAL WORK
Continued Stay Note  DAVID Medina     Patient Name: Kenji Dawson  MRN: [id number]  Today's Date: 8/3/2024    Admit Date: 8/1/2024    Plan: Home, no needs   Discharge Plan       Row Name 08/03/24 1243       Plan    Plan Home, no needs    Plan Comments Met with pt introduced self, role and discussed dc plans, needs.  He lives alone, is IADL's and uses no DME. He just obtained a car and now can drive himself to/from appointments.  He is still pending medicaid so while he is waiting to hear from medicaid, I gave him the pamphlet for [facility] here in [place] to use.  I told him that he will need to make appointments.  Once he gets medicaid he will look for a PCP and get a job now that he has a car.  I also gave him the phone number to call to check on his medicaid card.  I then gave him a handout on [facility] [employer] he can sign up for.  pt appreciative.  He hopes to go home today.  His nephew will pick him up.  Will follow. Thanks, Harriet PATHAK    Final Discharge Disposition Code 01 - home or self-care    Final Note Home                   Discharge Codes    No documentation.                 Expected Discharge Date and Time       Expected Discharge Date Expected Discharge Time    Aug 3, 2024               Harriet Castellanos

## 2024-08-04 ENCOUNTER — READMISSION MANAGEMENT (OUTPATIENT)
Dept: CALL CENTER | Facility: HOSPITAL | Age: 54
End: 2024-08-04
Payer: MEDICAID

## 2024-08-04 NOTE — OUTREACH NOTE
Prep Survey      Flowsheet Row Responses   [facility] facility patient discharged from? [place]   Is LACE score < 7 ? Yes   Eligibility Readm Mgmt   Discharge diagnosis Acute necrotizing cholecystitis Laparoscopic cholecystectomy with intraoperative cholangiogram   Does the patient have one of the following disease processes/diagnoses(primary or secondary)? General Surgery   Does the patient have Home health ordered? No   Is there a DME ordered? No   Prep survey completed? Yes            SUZETTE PATHAK - Registered Nurse

## 2024-08-06 LAB
LAB AP CASE REPORT: NORMAL
PATH REPORT.FINAL DX SPEC: NORMAL
PATH REPORT.GROSS SPEC: NORMAL

## 2024-08-19 ENCOUNTER — OFFICE VISIT (OUTPATIENT)
Dept: SURGERY | Facility: CLINIC | Age: 54
End: 2024-08-19
Payer: MEDICAID

## 2024-08-19 DIAGNOSIS — Z90.49 STATUS POST LAPAROSCOPIC CHOLECYSTECTOMY: Primary | ICD-10-CM

## 2024-08-19 PROCEDURE — 99024 POSTOP FOLLOW-UP VISIT: CPT | Performed by: SURGERY

## 2024-08-19 NOTE — PROGRESS NOTES
Kenji Dawson 53 y.o. male presents for PO FU Lap Judy.  Pt states he is feeling better.       HPI   Above note agree.  Kenji is doing well from his laparoscopic cholecystectomy.      Review of Systems        Past Medical History:   Diagnosis Date    HTN (hypertension)     Polycystic kidney disease            Past Surgical History:   Procedure Laterality Date    APPENDECTOMY      CHOLECYSTECTOMY WITH INTRAOPERATIVE CHOLANGIOGRAM N/A 8/2/2024    Procedure: CHOLECYSTECTOMY LAPAROSCOPIC INTRAOPERATIVE CHOLANGIOGRAM;  Surgeon: Keya Quigley DO;  Location: [facility];  Service: General;  Laterality: N/A;           Physical Exam  General:  Awake and alert with no acute distress  Eyes:  No icterus  Abdomen:  Soft, non-tender  Incision:  Clean, dry, intact       There were no vitals taken for this visit.        Diagnoses and all orders for this visit:    1. Status post laparoscopic cholecystectomy (Primary)    Kenji may return anytime as needed.    Thank you for allowing me to participate in the care of this interesting patient.

## (undated) DEVICE — UNDYED BRAIDED (POLYGLACTIN 910), SYNTHETIC ABSORBABLE SUTURE: Brand: COATED VICRYL

## (undated) DEVICE — SET CATH CHOLANG REDK W/SCOOP TIP INTRO 4F 50CM

## (undated) DEVICE — ENDOPATH XCEL UNIVERSAL TROCAR STABLILITY SLEEVES: Brand: ENDOPATH XCEL

## (undated) DEVICE — RESERVOIR,SUCTION,100CC,SILICONE: Brand: MEDLINE

## (undated) DEVICE — 2, DISPOSABLE SUCTION/IRRIGATOR WITH DISPOSABLE TIP: Brand: STRYKEFLOW

## (undated) DEVICE — DRP C/ARM 41X74IN

## (undated) DEVICE — SYR LUERLOK 20CC BX/50

## (undated) DEVICE — SUT VIC 0 UR6 27IN VCP603H

## (undated) DEVICE — BLD CLIP FLT UNIV DISP GRY

## (undated) DEVICE — DRN WND HUBLSS FLUT FULL PERF SIL10MM

## (undated) DEVICE — ENDOPATH XCEL BLADELESS TROCARS WITH STABILITY SLEEVES: Brand: ENDOPATH XCEL

## (undated) DEVICE — STRIP,CLOSURE,WOUND,MEDI-STRIP,1/2X4: Brand: MEDLINE

## (undated) DEVICE — DECANTER BAG 9": Brand: MEDLINE INDUSTRIES, INC.

## (undated) DEVICE — SPONGE,DRAIN,NONWVN,4"X4",6PLY,STRL,LF: Brand: MEDLINE

## (undated) DEVICE — LAPAROSCOPIC SMOKE FILTRATION SYSTEM: Brand: PALL LAPAROSHIELD® PLUS LAPAROSCOPIC SMOKE FILTRATION SYSTEM

## (undated) DEVICE — LINER SURG CANSTR SXN S/RIGD 1500CC

## (undated) DEVICE — DRSNG SURESITE WNDW 2.38X2.75

## (undated) DEVICE — GLV SURG SENSICARE W/ALOE PF LF 6.5 STRL

## (undated) DEVICE — TROCARS: Brand: KII® BALLOON BLUNT TIP SYSTEM

## (undated) DEVICE — PENCL SMOKE/EVAC MEGADYNE TELESCP 10FT

## (undated) DEVICE — CONTAINER,SPECIMEN,OR STERILE,4OZ: Brand: MEDLINE

## (undated) DEVICE — APPL CHLORAPREP HI/LITE 26ML ORNG

## (undated) DEVICE — TRANSFER SET 3": Brand: MEDLINE INDUSTRIES, INC.

## (undated) DEVICE — DRAPE,REIN 53X77,STERILE: Brand: MEDLINE

## (undated) DEVICE — SYR LUERLOK 30CC

## (undated) DEVICE — SUT ETHLN 3/0 FS1 663G

## (undated) DEVICE — PAD,NON-ADHERENT,3X8,STERILE,LF,1/PK: Brand: MEDLINE

## (undated) DEVICE — ENDOPOUCH RETRIEVER SPECIMEN RETRIEVAL BAGS: Brand: ENDOPOUCH RETRIEVER

## (undated) DEVICE — DRSNG SURESITE WNDW 4X4.5

## (undated) DEVICE — PK LAP GEN 90

## (undated) DEVICE — ADHS SKIN PREMIERPRO EXOFIN TOPICAL HI/VISC .5ML

## (undated) DEVICE — STERILE COTTON BALLS LARGE 5/P: Brand: MEDLINE

## (undated) DEVICE — LAPAROSCOPIC SCOPE WARMER: Brand: DEROYAL

## (undated) DEVICE — METZENBAUM SCISSOR TIP, DISPOSABLE: Brand: RENEW

## (undated) DEVICE — APPL HEMOS FOR DELIVERY FLOSEAL